# Patient Record
Sex: MALE | Race: BLACK OR AFRICAN AMERICAN | Employment: OTHER | URBAN - METROPOLITAN AREA
[De-identification: names, ages, dates, MRNs, and addresses within clinical notes are randomized per-mention and may not be internally consistent; named-entity substitution may affect disease eponyms.]

---

## 2024-07-04 ENCOUNTER — OFFICE VISIT (OUTPATIENT)
Age: 34
End: 2024-07-04

## 2024-07-04 VITALS
OXYGEN SATURATION: 98 % | WEIGHT: 226 LBS | SYSTOLIC BLOOD PRESSURE: 129 MMHG | TEMPERATURE: 98.4 F | RESPIRATION RATE: 18 BRPM | HEART RATE: 63 BPM | HEIGHT: 68 IN | BODY MASS INDEX: 34.25 KG/M2 | DIASTOLIC BLOOD PRESSURE: 79 MMHG

## 2024-07-04 DIAGNOSIS — Z20.2 POSSIBLE EXPOSURE TO STI: Primary | ICD-10-CM

## 2024-07-04 LAB
SPECIMEN TYPE: NORMAL
TRICHOMONAS VAGINALIS SCREEN: NEGATIVE

## 2024-07-04 RX ORDER — METRONIDAZOLE 500 MG/1
2000 TABLET ORAL ONCE
Qty: 4 TABLET | Refills: 0 | Status: SHIPPED | OUTPATIENT
Start: 2024-07-04 | End: 2024-07-04

## 2024-07-04 RX ORDER — CEFTRIAXONE 500 MG/1
500 INJECTION, POWDER, FOR SOLUTION INTRAMUSCULAR; INTRAVENOUS ONCE
Status: COMPLETED | OUTPATIENT
Start: 2024-07-04 | End: 2024-07-04

## 2024-07-04 RX ORDER — DOXYCYCLINE HYCLATE 100 MG
100 TABLET ORAL 2 TIMES DAILY
Qty: 14 TABLET | Refills: 0 | Status: SHIPPED | OUTPATIENT
Start: 2024-07-04 | End: 2024-07-11

## 2024-07-04 RX ADMIN — CEFTRIAXONE 500 MG: 500 INJECTION, POWDER, FOR SOLUTION INTRAMUSCULAR; INTRAVENOUS at 18:39

## 2024-07-04 ASSESSMENT — ENCOUNTER SYMPTOMS
VOMITING: 0
COUGH: 0
NAUSEA: 0
DIARRHEA: 0
ABDOMINAL PAIN: 0
BACK PAIN: 0

## 2024-07-04 NOTE — PATIENT INSTRUCTIONS
New Prescriptions    DOXYCYCLINE HYCLATE (VIBRA-TABS) 100 MG TABLET    Take 1 tablet by mouth 2 times daily for 7 days    METRONIDAZOLE (FLAGYL) 500 MG TABLET    Take 4 tablets by mouth once for 1 dose     Take antibiotics as prescribed.  Abstain from intercourse until your labs have resulted.  Encourage rest and increase fluid intake.  Follow-up with your PCP as needed.

## 2024-07-04 NOTE — PROGRESS NOTES
Jolly Knowles (:  1990) is a 34 y.o. male,New patient, here for evaluation of the following chief complaint(s):  Sexually Transmitted Diseases (Pt c/o irritation when urinating x 2 days, no discharge, pt states he wants treatment for STD's)      Assessment & Plan :   Diagnosis Orders   1. Possible exposure to STI  doxycycline hyclate (VIBRA-TABS) 100 MG tablet    metroNIDAZOLE (FLAGYL) 500 MG tablet    cefTRIAXone (ROCEPHIN) injection 500 mg    C.trachomatis N.gonorrhoeae DNA, Urine ([place] Only)    Trichomonas by EIA    HERPES SIMPLEX VIRUS (HSV) I/II ANTIBODIES IGG & IGM ([place] [facility])    HIV Screen    Syphilis Antibody Cascading Reflex         Differential diagnoses: UTI, pyelonephritis, chlamydia, gonorrhea, trichomonas   Plan: Patient will be treated today for chlamydia, gonorrhea, and trichomonas. He was given prescriptions for doxycycline and flagyl for chlamydia and trichomonas. He was given a shot of rocephin in the office today as well. Blood work was obtained for herpes, syphilis, and HIV and urine collected for chlamydia, gonorrhea, and trichomonas. Advised to abstain from intercourse until labs have resulted. We will call with lab results. Follow-up with PCP as needed. Return precautions discussed.   Follow up in 7 days if symptoms persist or if symptoms worsen.       Subjective :  HPI  Jolly Knowles is 34 y.o. male who presents with complaints of penile irritation. He reports that he has noted irritation when urinating the last two days. He denies any penile discharge or noting any penile sores. He denies any fevers, abdominal pain, back pain, nausea, vomiting, or diarrhea. He denies any known exposure to a STI but would like to be tested and treated today. He is requesting blood work today as well.       Sexually Transmitted Diseases  Associated symptoms include dysuria. Pertinent negatives include no abdominal pain, chills, coughing, diarrhea, fever, flank pain, frequency,  I will START or STAY ON the medications listed below when I get home from the hospital:    predniSONE 20 mg oral tablet  -- 2 tab(s) by mouth once a day, [facility] day July 10  -- Indication: For Sialadenitis inflammation    Augmentin 875 mg-125 mg oral tablet  -- 1 tab(s) by mouth 2 times a day   -- Finish all this medication unless otherwise directed by prescriber.  Take with food or milk.    -- Indication: For Sialadenitis infection    Protonix 40 mg oral delayed release tablet  -- 1 tab(s) by mouth once a day  -- Indication: For GERD I will START or STAY ON the medications listed below when I get home from the hospital:    predniSONE 10 mg oral tablet  -- 4 tab(s) by mouth once a day for one day, then  3 tab(s) by mouth the day after, then 2 tab(s) by mouth the day after, then 1 tab orally  -- Indication: For Sialadenitis, taper    Augmentin 875 mg-125 mg oral tablet  -- 1 tab(s) by mouth every 12 hours x 10 days   -- Finish all this medication unless otherwise directed by prescriber.  Take with food or milk.    -- Indication: For Sialadenitis    Protonix 40 mg oral delayed release tablet  -- 1 tab(s) by mouth once a day  -- Indication: For GERD

## 2024-07-05 LAB
C TRACH DNA UR QL NAA+PROBE: NEGATIVE
HIV 1+2 AB+HIV1 P24 AG SERPL QL IA: NORMAL
HIV 2 AB SERPL QL IA: NORMAL
HIV1 AB SERPL QL IA: NORMAL
HIV1 P24 AG SERPL QL IA: NORMAL
N GONORRHOEA DNA UR QL NAA+PROBE: NEGATIVE
REAGIN+T PALLIDUM IGG+IGM SERPL-IMP: NORMAL

## 2024-07-07 LAB
HSV1+2 IGG SER IA-ACNC: >22.4 IV
HSV1+2 IGM SER IA-ACNC: 0.33 IV